# Patient Record
Sex: MALE | Race: WHITE | NOT HISPANIC OR LATINO | Employment: OTHER | URBAN - METROPOLITAN AREA
[De-identification: names, ages, dates, MRNs, and addresses within clinical notes are randomized per-mention and may not be internally consistent; named-entity substitution may affect disease eponyms.]

---

## 2018-03-15 ENCOUNTER — HOSPITAL ENCOUNTER (OUTPATIENT)
Dept: LAB | Facility: HOSPITAL | Age: 58
Setting detail: SPECIMEN
Discharge: HOME | End: 2018-03-15
Attending: INTERNAL MEDICINE | Admitting: INTERNAL MEDICINE

## 2018-03-28 ENCOUNTER — HOSPITAL ENCOUNTER (OUTPATIENT)
Dept: ULTRASOUND IMAGING | Facility: HOSPITAL | Age: 58
Discharge: HOME | End: 2018-03-28
Attending: INTERNAL MEDICINE | Admitting: INTERNAL MEDICINE

## 2019-08-19 ENCOUNTER — APPOINTMENT (OUTPATIENT)
Dept: GENERAL RADIOLOGY | Facility: HOSPITAL | Age: 59
End: 2019-08-19

## 2019-08-19 ENCOUNTER — HOSPITAL ENCOUNTER (EMERGENCY)
Facility: HOSPITAL | Age: 59
Discharge: ANOTHER HOSPITAL | End: 2019-08-19
Attending: EMERGENCY MEDICINE | Admitting: EMERGENCY MEDICINE

## 2019-08-19 VITALS
HEART RATE: 80 BPM | SYSTOLIC BLOOD PRESSURE: 153 MMHG | DIASTOLIC BLOOD PRESSURE: 106 MMHG | BODY MASS INDEX: 44.73 KG/M2 | RESPIRATION RATE: 16 BRPM | TEMPERATURE: 98 F | WEIGHT: 302 LBS | OXYGEN SATURATION: 98 % | HEIGHT: 69 IN

## 2019-08-19 DIAGNOSIS — R07.9 CHEST PAIN, UNSPECIFIED TYPE: Primary | ICD-10-CM

## 2019-08-19 DIAGNOSIS — Z86.79 HISTORY OF HYPERTENSION: ICD-10-CM

## 2019-08-19 DIAGNOSIS — R73.9 HYPERGLYCEMIA: ICD-10-CM

## 2019-08-19 DIAGNOSIS — I10 HYPERTENSION, UNSPECIFIED TYPE: ICD-10-CM

## 2019-08-19 DIAGNOSIS — Z86.39 HISTORY OF HYPERLIPIDEMIA: ICD-10-CM

## 2019-08-19 DIAGNOSIS — Z86.39 HISTORY OF DIABETES MELLITUS: ICD-10-CM

## 2019-08-19 LAB
ALBUMIN SERPL-MCNC: 4.2 G/DL (ref 3.5–5.2)
ALBUMIN/GLOB SERPL: 1.4 G/DL
ALP SERPL-CCNC: 69 U/L (ref 39–117)
ALT SERPL W P-5'-P-CCNC: 6 U/L (ref 1–41)
AMPHET+METHAMPHET UR QL: NEGATIVE
ANION GAP SERPL CALCULATED.3IONS-SCNC: 12.8 MMOL/L (ref 5–15)
AST SERPL-CCNC: 22 U/L (ref 1–40)
BACTERIA UR QL AUTO: ABNORMAL /HPF
BARBITURATES UR QL SCN: NEGATIVE
BASOPHILS # BLD AUTO: 0.03 10*3/MM3 (ref 0–0.2)
BASOPHILS NFR BLD AUTO: 0.3 % (ref 0–1.5)
BENZODIAZ UR QL SCN: NEGATIVE
BILIRUB SERPL-MCNC: 0.4 MG/DL (ref 0.2–1.2)
BILIRUB UR QL STRIP: NEGATIVE
BUN BLD-MCNC: 17 MG/DL (ref 6–20)
BUN/CREAT SERPL: 17 (ref 7–25)
CALCIUM SPEC-SCNC: 8.7 MG/DL (ref 8.6–10.5)
CANNABINOIDS SERPL QL: NEGATIVE
CHLORIDE SERPL-SCNC: 99 MMOL/L (ref 98–107)
CLARITY UR: CLEAR
CO2 SERPL-SCNC: 26.2 MMOL/L (ref 22–29)
COCAINE UR QL: NEGATIVE
COLOR UR: YELLOW
CREAT BLD-MCNC: 1 MG/DL (ref 0.76–1.27)
DEPRECATED RDW RBC AUTO: 49.3 FL (ref 37–54)
EOSINOPHIL # BLD AUTO: 0.05 10*3/MM3 (ref 0–0.4)
EOSINOPHIL NFR BLD AUTO: 0.6 % (ref 0.3–6.2)
ERYTHROCYTE [DISTWIDTH] IN BLOOD BY AUTOMATED COUNT: 14.4 % (ref 12.3–15.4)
GFR SERPL CREATININE-BSD FRML MDRD: 77 ML/MIN/1.73
GLOBULIN UR ELPH-MCNC: 3 GM/DL
GLUCOSE BLD-MCNC: 217 MG/DL (ref 65–99)
GLUCOSE UR STRIP-MCNC: ABNORMAL MG/DL
HCT VFR BLD AUTO: 45.5 % (ref 37.5–51)
HGB BLD-MCNC: 14.4 G/DL (ref 13–17.7)
HGB UR QL STRIP.AUTO: NEGATIVE
HYALINE CASTS UR QL AUTO: ABNORMAL /LPF
IMM GRANULOCYTES # BLD AUTO: 0.03 10*3/MM3 (ref 0–0.05)
IMM GRANULOCYTES NFR BLD AUTO: 0.3 % (ref 0–0.5)
KETONES UR QL STRIP: NEGATIVE
LEUKOCYTE ESTERASE UR QL STRIP.AUTO: NEGATIVE
LIPASE SERPL-CCNC: 133 U/L (ref 13–60)
LYMPHOCYTES # BLD AUTO: 1.49 10*3/MM3 (ref 0.7–3.1)
LYMPHOCYTES NFR BLD AUTO: 17.4 % (ref 19.6–45.3)
MAGNESIUM SERPL-MCNC: 1.9 MG/DL (ref 1.6–2.6)
MCH RBC QN AUTO: 29.3 PG (ref 26.6–33)
MCHC RBC AUTO-ENTMCNC: 31.6 G/DL (ref 31.5–35.7)
MCV RBC AUTO: 92.5 FL (ref 79–97)
METHADONE UR QL SCN: NEGATIVE
MONOCYTES # BLD AUTO: 0.57 10*3/MM3 (ref 0.1–0.9)
MONOCYTES NFR BLD AUTO: 6.6 % (ref 5–12)
NEUTROPHILS # BLD AUTO: 6.41 10*3/MM3 (ref 1.7–7)
NEUTROPHILS NFR BLD AUTO: 74.8 % (ref 42.7–76)
NITRITE UR QL STRIP: NEGATIVE
NRBC BLD AUTO-RTO: 0 /100 WBC (ref 0–0.2)
NT-PROBNP SERPL-MCNC: 226.4 PG/ML (ref 5–900)
OPIATES UR QL: NEGATIVE
OXYCODONE UR QL SCN: NEGATIVE
PH UR STRIP.AUTO: 7 [PH] (ref 5–8)
PLATELET # BLD AUTO: 185 10*3/MM3 (ref 140–450)
PMV BLD AUTO: 10.5 FL (ref 6–12)
POTASSIUM BLD-SCNC: 3.6 MMOL/L (ref 3.5–5.2)
PROT SERPL-MCNC: 7.2 G/DL (ref 6–8.5)
PROT UR QL STRIP: ABNORMAL
RBC # BLD AUTO: 4.92 10*6/MM3 (ref 4.14–5.8)
RBC # UR: ABNORMAL /HPF
REF LAB TEST METHOD: ABNORMAL
SODIUM BLD-SCNC: 138 MMOL/L (ref 136–145)
SP GR UR STRIP: 1.02 (ref 1–1.03)
SQUAMOUS #/AREA URNS HPF: ABNORMAL /HPF
TROPONIN T SERPL-MCNC: <0.01 NG/ML (ref 0–0.03)
UROBILINOGEN UR QL STRIP: ABNORMAL
WBC NRBC COR # BLD: 8.58 10*3/MM3 (ref 3.4–10.8)
WBC UR QL AUTO: ABNORMAL /HPF

## 2019-08-19 PROCEDURE — 99284 EMERGENCY DEPT VISIT MOD MDM: CPT

## 2019-08-19 PROCEDURE — 81001 URINALYSIS AUTO W/SCOPE: CPT | Performed by: EMERGENCY MEDICINE

## 2019-08-19 PROCEDURE — 85025 COMPLETE CBC W/AUTO DIFF WBC: CPT | Performed by: EMERGENCY MEDICINE

## 2019-08-19 PROCEDURE — 80307 DRUG TEST PRSMV CHEM ANLYZR: CPT | Performed by: EMERGENCY MEDICINE

## 2019-08-19 PROCEDURE — 83735 ASSAY OF MAGNESIUM: CPT | Performed by: EMERGENCY MEDICINE

## 2019-08-19 PROCEDURE — 83880 ASSAY OF NATRIURETIC PEPTIDE: CPT | Performed by: EMERGENCY MEDICINE

## 2019-08-19 PROCEDURE — 80053 COMPREHEN METABOLIC PANEL: CPT | Performed by: EMERGENCY MEDICINE

## 2019-08-19 PROCEDURE — 83690 ASSAY OF LIPASE: CPT | Performed by: EMERGENCY MEDICINE

## 2019-08-19 PROCEDURE — 93010 ELECTROCARDIOGRAM REPORT: CPT | Performed by: INTERNAL MEDICINE

## 2019-08-19 PROCEDURE — 84484 ASSAY OF TROPONIN QUANT: CPT | Performed by: EMERGENCY MEDICINE

## 2019-08-19 PROCEDURE — 71045 X-RAY EXAM CHEST 1 VIEW: CPT

## 2019-08-19 PROCEDURE — 93005 ELECTROCARDIOGRAM TRACING: CPT | Performed by: EMERGENCY MEDICINE

## 2019-08-19 RX ORDER — ACETAMINOPHEN 500 MG
1000 TABLET ORAL ONCE
Status: COMPLETED | OUTPATIENT
Start: 2019-08-19 | End: 2019-08-19

## 2019-08-19 RX ORDER — NITROGLYCERIN 0.4 MG/1
0.4 TABLET SUBLINGUAL
Status: COMPLETED | OUTPATIENT
Start: 2019-08-19 | End: 2019-08-19

## 2019-08-19 RX ADMIN — NITROGLYCERIN 0.4 MG: 0.4 TABLET SUBLINGUAL at 10:30

## 2019-08-19 RX ADMIN — ACETAMINOPHEN 1000 MG: 500 TABLET, FILM COATED ORAL at 10:13

## 2019-08-19 RX ADMIN — NITROGLYCERIN 0.4 MG: 0.4 TABLET SUBLINGUAL at 10:13

## 2019-08-19 RX ADMIN — NITROGLYCERIN 0.4 MG: 0.4 TABLET SUBLINGUAL at 10:21

## 2019-08-19 NOTE — PROGRESS NOTES
After Dr. Garcia's decision that patient needs to be admitted, spoke w/ patient re his options as [facility]. [facility]. [facility] to [facility]

## 2019-08-19 NOTE — ED PROVIDER NOTES
" EMERGENCY DEPARTMENT ENCOUNTER    CHIEF COMPLAINT  Chief Complaint: chest pain  History given by: patient  History limited by: nothing  Room Number: 10/10  PMD: Penny Lugo MD      HPI:  Pt is a 58 y.o. male who presents complaining of waxing and waning left anterior chest pain that began around 0130 this morning while he was lying in bed. The patient describes the pain as \"someone sitting on my chest\". The patient denies radiation of the pain. The patient also complains of associated shortness of breath, nausea, headache, light-headedness, and vertigo. The patient reports he had similar pain two months ago at which time he was admitted at [facility] and had an abnormal nuclear stress test (the patient is unaware of the specific abnormalities). The patient reports he did not have a heart cath done at that time but states he had a heart cath done about three years ago. The patient reports he took ASA x 3 PTA without relief of his pain. The patient has a hx of hyperlipidemia, hypertension, diabetes, and congestive heart failure. The patient reports he is not compliant with his daily medications. The patient reports family cardiac history. The patient reports he has a hx of IV drug use (Methamphetamine and Heroin). The patient reports he has not used within the past year. The patient is a former smoker. He states he quit smoking in 1995. There are no other complaints at this time.    Duration: since 0130 this morning  Onset: sudden  Timing: waxing and waning  Location: left anterior chest  Radiation: pt denies radiation of pain  Quality: pain  Intensity/Severity: moderate  Progression: unchanged  Associated Symptoms: shortness of breath, nausea, headache, light-headedness, and vertigo  Aggravating Factors: none specified  Alleviating Factors: none specified  Previous Episodes: The patient reports he had similar pain two months ago at which time he was admitted at [facility] and had an abnormal " nuclear stress test. The patient reports he did not have a heart cath done at that time but states he had a heart cath done about three years ago.  Treatment before arrival: The patient reports he took ASA x 3 PTA without relief of his pain.    PAST MEDICAL HISTORY  Active Ambulatory Problems     Diagnosis Date Noted   • No Active Ambulatory Problems     Resolved Ambulatory Problems     Diagnosis Date Noted   • No Resolved Ambulatory Problems     Past Medical History:   Diagnosis Date   • CHF (congestive heart failure) (CMS/Formerly [facility])    • Diabetes mellitus (CMS/Formerly [facility])    • Drug addiction in remission (CMS/Formerly [facility])    • Hyperlipidemia    • Hypertension    • Hypokalemia    • Sleep apnea        PAST SURGICAL HISTORY  Past Surgical History:   Procedure Laterality Date   • HERNIA REPAIR     • KNEE SURGERY     • NECK SURGERY         FAMILY HISTORY  History reviewed. No pertinent family history.    SOCIAL HISTORY  Social History     Socioeconomic History   • Marital status:      Spouse name: Not on file   • Number of children: Not on file   • Years of education: Not on file   • Highest education level: Not on file   Tobacco Use   • Smoking status: Never Smoker   Substance and Sexual Activity   • Alcohol use: No     Frequency: Never   • Drug use: No     Comment: former IV drug use   • Sexual activity: Defer       ALLERGIES  Patient has no known allergies.    REVIEW OF SYSTEMS  Review of Systems   Constitutional: Negative for activity change, appetite change and fever.   HENT: Negative for congestion and sore throat.    Eyes: Negative.    Respiratory: Positive for shortness of breath. Negative for cough.    Cardiovascular: Positive for chest pain (left anterior). Negative for leg swelling.   Gastrointestinal: Positive for nausea. Negative for abdominal pain, diarrhea and vomiting.   Endocrine: Negative.    Genitourinary: Negative for decreased urine volume and dysuria.   Musculoskeletal: Negative for neck pain.   Skin: Negative  for rash and wound.   Allergic/Immunologic: Negative.    Neurological: Positive for dizziness, light-headedness and headaches. Negative for weakness and numbness.   Hematological: Negative.    Psychiatric/Behavioral: Negative.    All other systems reviewed and are negative.      PHYSICAL EXAM  ED Triage Vitals   Temp Heart Rate Resp BP SpO2   08/19/19 0847 08/19/19 0848 08/19/19 0848 -- 08/19/19 0848   98 °F (36.7 °C) 93 20  96 %      Temp src Heart Rate Source Patient Position BP Location FiO2 (%)   08/19/19 0847 -- -- -- --   Tympanic           Physical Exam   Constitutional: He is oriented to person, place, and time and well-developed, well-nourished, and in no distress. No distress.   HENT:   Head: Normocephalic and atraumatic.   Mouth/Throat: Mucous membranes are normal.   Eyes: EOM are normal.   Neck: Normal range of motion.   Cardiovascular: Normal rate and regular rhythm. Exam reveals no gallop and no friction rub.   No murmur heard.  Pulmonary/Chest: Effort normal. No respiratory distress. He has no wheezes. He has no rhonchi. He has no rales. He exhibits no tenderness.   Abdominal: Soft. He exhibits no distension. There is no tenderness. There is no guarding.   Musculoskeletal: Normal range of motion. He exhibits no deformity.   Neurological: He is alert and oriented to person, place, and time. GCS score is 15.   moving all extremities, no focal deficits   Skin: Skin is warm and dry. He is not diaphoretic.   Psychiatric:   Calm, cooperative   Nursing note and vitals reviewed.      LAB RESULTS  Lab Results (last 24 hours)     Procedure Component Value Units Date/Time    CBC & Differential [[id number]] Collected:  08/19/19 0859    Specimen:  Blood Updated:  08/19/19 0908    Narrative:       The following orders were created for panel order CBC & Differential.  Procedure                               Abnormality         Status                     ---------                               -----------          ------                     CBC Auto Differential[[id number]]        Abnormal            Final result                 Please view results for these tests on the individual orders.    Comprehensive Metabolic Panel [[id number]]  (Abnormal) Collected:  08/19/19 0859    Specimen:  Blood Updated:  08/19/19 0938     Glucose 217 mg/dL      BUN 17 mg/dL      Creatinine 1.00 mg/dL      Sodium 138 mmol/L      Potassium 3.6 mmol/L      Chloride 99 mmol/L      CO2 26.2 mmol/L      Calcium 8.7 mg/dL      Total Protein 7.2 g/dL      Albumin 4.20 g/dL      ALT (SGPT) 6 U/L      AST (SGOT) 22 U/L      Alkaline Phosphatase 69 U/L      Total Bilirubin 0.4 mg/dL      eGFR Non African Amer 77 mL/min/1.73      Globulin 3.0 gm/dL      A/G Ratio 1.4 g/dL      BUN/Creatinine Ratio 17.0     Anion Gap 12.8 mmol/L     Narrative:       GFR Normal >60  Chronic Kidney Disease <60  Kidney Failure <15    Lipase [[id number]]  (Abnormal) Collected:  08/19/19 0859    Specimen:  Blood Updated:  08/19/19 0938     Lipase 133 U/L     Troponin [[id number]]  (Normal) Collected:  08/19/19 0859    Specimen:  Blood Updated:  08/19/19 0938     Troponin T <0.010 ng/mL     Narrative:       Troponin T Reference Range:  <= 0.03 ng/mL-   Negative for AMI  >0.03 ng/mL-     Abnormal for myocardial necrosis.  Clinicians would have to utilize clinical acumen, EKG, Troponin and serial changes to determine if it is an Acute Myocardial Infarction or myocardial injury due to an underlying chronic condition.     BNP [[id number]]  (Normal) Collected:  08/19/19 0859    Specimen:  Blood Updated:  08/19/19 0937     proBNP 226.4 pg/mL     Narrative:       Among patients with dyspnea, NT-proBNP is highly sensitive for the detection of acute congestive heart failure. In addition NT-proBNP of <300 pg/ml effectively rules out acute congestive heart failure with 99% negative predictive value.    Magnesium [[id number]]  (Normal) Collected:  08/19/19 0859    Specimen:  Blood Updated:   08/19/19 0938     Magnesium 1.9 mg/dL     CBC Auto Differential [[id number]]  (Abnormal) Collected:  08/19/19 0859    Specimen:  Blood Updated:  08/19/19 0908     WBC 8.58 10*3/mm3      RBC 4.92 10*6/mm3      Hemoglobin 14.4 g/dL      Hematocrit 45.5 %      MCV 92.5 fL      MCH 29.3 pg      MCHC 31.6 g/dL      RDW 14.4 %      RDW-SD 49.3 fl      MPV 10.5 fL      Platelets 185 10*3/mm3      Neutrophil % 74.8 %      Lymphocyte % 17.4 %      Monocyte % 6.6 %      Eosinophil % 0.6 %      Basophil % 0.3 %      Immature Grans % 0.3 %      Neutrophils, Absolute 6.41 10*3/mm3      Lymphocytes, Absolute 1.49 10*3/mm3      Monocytes, Absolute 0.57 10*3/mm3      Eosinophils, Absolute 0.05 10*3/mm3      Basophils, Absolute 0.03 10*3/mm3      Immature Grans, Absolute 0.03 10*3/mm3      nRBC 0.0 /100 WBC     Urinalysis With Microscopic If Indicated (No Culture) - Urine, Clean Catch [[id number]] Collected:  08/19/19 0932    Specimen:  Urine, Clean Catch Updated:  08/19/19 0939    Urinalysis, Microscopic Only - Urine, Clean Catch [[id number]] Collected:  08/19/19 0932    Specimen:  Urine, Clean Catch Updated:  08/19/19 1007          I ordered the above labs and reviewed the results    RADIOLOGY  XR Chest 1 View   Final Result   FINDINGS AND IMPRESSION:   The lungs are clear. No pleural effusion or pneumothorax is seen. The   heart is normal in size.       This report was finalized on 8/19/2019 9:44 AM by Dr. Gene Delacruz M.D.               I ordered the above noted radiological studies. Interpreted by radiologist. Reviewed by me in PACS.       PROCEDURES  Procedures    EKG          EKG time: 0851  Rhythm/Rate: SR, 79  P waves and MI: nml; nml  QRS, axis: nml; nml   ST and T waves: TWIs noted in I and aVL, no STEMI    Interpreted Contemporaneously by me, independently viewed  Unchanged compared to prior from 01/2017.    PROGRESS AND CONSULTS  ED Course as of Aug 19 1020   Mon Aug 19, 2019   1015 HEART score 5    [DC]   1018  [facility], will [facility] ER transfer per patient request. Initial troponin negative, no acute ischemic EKG changes. ASA taken at home, nitro given in ED here without major change.   [DC]      ED Course User Index  [DC] Douglas Garcia MD     0849 EKG was ordered for further evaluation d/t ER Triage Protocol.    0851 CXR, UA, and blood work were ordered for further evaluation.    1003 Spoke with MARVEL Alvarez RN, about the patient's case. She stated the patient's insurance is not accepted at [facility]. She spoke with the patient who would [facility]. She stated the patient will be an ER to ER transfer. She stated [facility] the [facility] should call me within the next 25 minutes.    1004 Ordered Nitroglycerin for pain management.    1016 Received a call from Dr. Tariq ([facility]ician at the [facility]) and discussed pt's case. Dr. Tariq agreed with plan to accept the patient. She requested that I order a UDS for further evaluation.    1017 Ordered UDS for further evaluation.    1021 Rechecked the patient. He is in NAD. Patient is stable. BP- (!) 194/102 HR- 67 Temp- 98 °F (36.7 °C) (Tympanic) O2 sat- 98%. Informed the patient of his negative CXR, negative Troponin, and EKG that shows no ischemic changes. Discussed the plan to [facility] the [facility] d[facility]t his insurance and for continuity of care. Pt understands and agrees with the plan, all questions answered.      MEDICAL DECISION MAKING  Results were reviewed/discussed with the patient and they were also made aware of online access. Pt also made aware that some labs, such as cultures, will not be resulted during ER visit and follow up with PMD is necessary.     MDM  Number of Diagnoses or Management Options  Chest pain, unspecified type:   History of diabetes mellitus:   History of hyperlipidemia:   History of hypertension:   Hyperglycemia:   Hypertension, unspecified type:   Diagnosis management comments: Patient  presents today with chest pain has been constant and described as a pressure since around 2 AM this morning.  Has had no aggravating or alleviating factors and has had no significant improvement with aspirin to arrival.  Patient with a heart score of 5 today, and he reported having a an abnormal nuclear stress test at [facility] approximately 2 months ago, though records obtained from them do not show any indication of a stress test performed recently.  Patient has been noncompliant with recent medications for diabetes, hypertension, and hyperlipidemia.  Does have family history with his father with coronary disease in addition to history of amphetamine and heroin abuse in the past.  Overall, high risk patient with active chest pain, no acute ischemic changes on EKG and negative troponin.  [facility] and [facility] spoken with Dr. Tariq in the Emergency Department there who will accept ED to ED transfer. Patient has been updated on the course and plan and findings today, and has been notified of the risks of transfer as documented in the EMTALA documentation, and is in agreement and willing [facility].        Amount and/or Complexity of Data Reviewed  Clinical lab tests: reviewed and ordered (Troponin is <0.010.)  Tests in the radiology section of CPT®: ordered and reviewed (CXR shows that the lungs are clear. No pleural effusion or pneumothorax is seen. The heart is normal in size.)  Tests in the medicine section of CPT®: ordered and reviewed (See procedure notes for EKG interpretation.)  Decide to obtain previous medical records or to obtain history from someone other than the patient: yes (Epic)  Review and summarize past medical records: yes (The patient has no pertinent previous records in Epic.)  Discuss the patient with other providers: yes (Dr. Tariq ([facility]))  Independent visualization of images, tracings, or specimens: yes    Patient Progress  Patient  progress: stable         DIAGNOSIS  Final diagnoses:   Chest pain, unspecified type   Hypertension, unspecified type   History of hypertension   Hyperglycemia   History of diabetes mellitus   History of hyperlipidemia       DISPOSITION  [facility]    Latest Documented Vital Signs:  As of 10:20 AM  BP- (!) 194/102 HR- 67 Temp- 98 °F (36.7 °C) (Tympanic) O2 sat- 98%    --  Documentation assistance provided by sylvester Vela for Dr. Douglas Garcia MD.  Information recorded by the scribe was done at my direction and has been verified and validated by me.       Kathryn Vela  08/19/19 7017       Douglas Garcia MD  08/19/19 7716

## 2019-08-19 NOTE — PROGRESS NOTES
[facility] returned call and will have [facility] ER MD call Dr. Garcia. Dr. Garcia and Lala RN updated and Lala given # to call report ([phone]) after patient is accepted. 's Preference to Transfer form already completed